# Patient Record
Sex: MALE
[De-identification: names, ages, dates, MRNs, and addresses within clinical notes are randomized per-mention and may not be internally consistent; named-entity substitution may affect disease eponyms.]

---

## 2018-01-29 ENCOUNTER — HOSPITAL ENCOUNTER (OUTPATIENT)
Dept: HOSPITAL 88 - OR | Age: 58
Discharge: HOME | End: 2018-01-29
Attending: SPECIALIST
Payer: COMMERCIAL

## 2018-01-29 DIAGNOSIS — T84.7XXA: Primary | ICD-10-CM

## 2018-01-29 DIAGNOSIS — I10: ICD-10-CM

## 2018-01-29 DIAGNOSIS — F17.210: ICD-10-CM

## 2018-01-29 DIAGNOSIS — Z01.810: ICD-10-CM
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
## 2018-01-29 DIAGNOSIS — L03.116: ICD-10-CM

## 2018-01-29 PROCEDURE — 87205 SMEAR GRAM STAIN: CPT

## 2018-01-29 PROCEDURE — 87075 CULTR BACTERIA EXCEPT BLOOD: CPT

## 2018-01-29 PROCEDURE — 87071 CULTURE AEROBIC QUANT OTHER: CPT

## 2018-01-29 PROCEDURE — 20680 REMOVAL OF IMPLANT DEEP: CPT

## 2018-01-29 PROCEDURE — 93005 ELECTROCARDIOGRAM TRACING: CPT

## 2018-01-29 PROCEDURE — 76000 FLUOROSCOPY <1 HR PHYS/QHP: CPT

## 2018-01-29 NOTE — OPERATIVE REPORT
DATE OF PROCEDURE:  January 29, 2018 



ASSISTANT:   Piyush Cheema PA-C



The patient was brought to the operating room for induction of anesthesia.  

Throughout this case, my PA's assistance was necessary for retraction of 

soft tissue and positioning of the extremity.  This allows for efficient 

and technically successful execution of the operation and is considered 

medically necessary.



PREOPERATIVE DIAGNOSIS:  Complication of infection involving hardware, left 

ankle.



POSTOPERATIVE DIAGNOSIS:  Complication of infection involving hardware, 

left ankle.



PROCEDURE:  Left ankle irrigation and sharp debridement and hardware 

removal.



INDICATIONS:  The patient is a 57-year-old gentleman who is several months 

status post an ORIF of a left ankle fracture.  He has a wound infection.  

We have tried to treat this with antibiotics and local wound care without 

success.  He has a history of drinking and tobacco abuse.  We have 

discussed the findings with the patient.  We plan on hardware removal and 

debridement.  The risks and benefits were explained.  He stated he 

understood and wished to proceed.



DESCRIPTION OF PROCEDURE:  The patient was brought to the operating room 

and placed under general anesthetic.  His left lower extremity was prepped 

and draped in a sterile manner.  A preoperative time out was performed.  

The previous incision was utilized.  There was some purulent material 

evacuated underneath the incision.  This was thoroughly and sharply 

debrided with a surgical knife.  The hardware was carefully exposed.  The 

distal fibular plate was removed.  A curette and a periosteal elevator were 

used to debride the fibrinous exudate.  The skin margins were freshened up 

with the surgical knife.  The wound was thoroughly irrigated with a 

shower-tip pulsatile lavage.  The skin was closed loosely with interrupted 

4-0 nylon stitches.  A sterile bandage was applied.  The patient was 

extubated and transported to the 

recovery room in stable condition.  There was no blood loss.  All needle 

and sponge counts were correct.  Two cultures were taken at the time of the 

surgery.  

 









DD:  01/29/2018 08:52

DT:  01/29/2018 10:35

Job#:  [id number]